# Patient Record
Sex: MALE | Employment: FULL TIME | URBAN - METROPOLITAN AREA
[De-identification: names, ages, dates, MRNs, and addresses within clinical notes are randomized per-mention and may not be internally consistent; named-entity substitution may affect disease eponyms.]

---

## 2024-05-31 ENCOUNTER — HOSPITAL ENCOUNTER (EMERGENCY)
Facility: HOSPITAL | Age: 39
Discharge: HOME | End: 2024-05-31
Payer: COMMERCIAL

## 2024-05-31 VITALS
WEIGHT: 162 LBS | DIASTOLIC BLOOD PRESSURE: 106 MMHG | SYSTOLIC BLOOD PRESSURE: 175 MMHG | BODY MASS INDEX: 26.99 KG/M2 | RESPIRATION RATE: 16 BRPM | HEART RATE: 76 BPM | HEIGHT: 65 IN | OXYGEN SATURATION: 99 % | TEMPERATURE: 98.5 F

## 2024-05-31 DIAGNOSIS — S05.02XA ABRASION OF LEFT CORNEA, INITIAL ENCOUNTER: Primary | ICD-10-CM

## 2024-05-31 PROCEDURE — 99283 EMERGENCY DEPT VISIT LOW MDM: CPT

## 2024-05-31 RX ORDER — CIPROFLOXACIN HYDROCHLORIDE 3 MG/ML
1 SOLUTION/ DROPS OPHTHALMIC
Qty: 5 ML | Refills: 0 | Status: SHIPPED | OUTPATIENT
Start: 2024-05-31 | End: 2024-06-10

## 2024-05-31 ASSESSMENT — COLUMBIA-SUICIDE SEVERITY RATING SCALE - C-SSRS
2. HAVE YOU ACTUALLY HAD ANY THOUGHTS OF KILLING YOURSELF?: NO
1. IN THE PAST MONTH, HAVE YOU WISHED YOU WERE DEAD OR WISHED YOU COULD GO TO SLEEP AND NOT WAKE UP?: NO
6. HAVE YOU EVER DONE ANYTHING, STARTED TO DO ANYTHING, OR PREPARED TO DO ANYTHING TO END YOUR LIFE?: NO

## 2024-05-31 ASSESSMENT — VISUAL ACUITY
OS: 20/30
OU: 20/15
OD: 20/25

## 2024-05-31 ASSESSMENT — LIFESTYLE VARIABLES
HAVE YOU EVER FELT YOU SHOULD CUT DOWN ON YOUR DRINKING: NO
TOTAL SCORE: 0
HAVE PEOPLE ANNOYED YOU BY CRITICIZING YOUR DRINKING: NO
EVER FELT BAD OR GUILTY ABOUT YOUR DRINKING: NO
EVER HAD A DRINK FIRST THING IN THE MORNING TO STEADY YOUR NERVES TO GET RID OF A HANGOVER: NO

## 2024-05-31 NOTE — Clinical Note
Rafael Arciniega was seen and treated in our emergency department on 5/31/2024.  He may return to work on 06/02/2024.       If you have any questions or concerns, please don't hesitate to call.      Umang Rebolledo PA-C

## 2024-06-01 NOTE — ED PROVIDER NOTES
EMERGENCY MEDICINE EVALUATION NOTE    History of Present Illness     Chief Complaint:   Chief Complaint   Patient presents with    Eye Pain     Left eye, was poked with finger in eye by 2 year old       HPI: Rafael Arciniega is a 38 y.o. male presents with a chief complaint of left eye pain.  Patient reports that he was poked in the left eye by his 2-year-old just prior to arrival.  He thinks he has a scratch on his eye as the child's fingernail scratched his eye.  He reports that he has no problems with his vision since then he just has a lot of pain.  Patient reports anytime he has his eye open the eye becomes more painful.  Patient admits to increased tearing.  Denies any significant past medical history denies any medication allergies.  Patient does not wear any corrective lenses or use any glasses.  Patient denies any medication allergies.    Previous History     Past Medical History:   Diagnosis Date    Unspecified fracture of lower end of left humerus, initial encounter for closed fracture     Elbow fracture, left     No past surgical history on file.     No family history on file.  No Known Allergies  Current Outpatient Medications   Medication Instructions    ciprofloxacin (Ciloxan) 0.3 % ophthalmic solution 1 drop, Left Eye, Every 2 hours       Physical Exam     Appearance: Alert, oriented , cooperative,  in no acute distress.      Skin: Intact,  dry skin, no lesions, rash, petechiae or purpura.      Eyes: PERRLA, EOMs intact,  Conjunctiva erythematous and slightly injected of left eye.  Corneal abrasion noted at the roughly 6 to 7 o'clock position in the left eye.  No Jvue sign.  Patient declined pressure testing.  Visual acuity documented by nursing staff.     ENT: Hearing grossly intact. Pharynx clear     Neck: Supple. Trachea at midline.      Pulmonary: Clear bilaterally. No rales, rhonchi or wheezing. No accessory muscle use or stridor.     Cardiac: Normal rate and rhythm without murmur     Abdomen:  "Soft, nontender, active bowel sounds.     Musculoskeletal: Full range of motion.      Neurological:Cranial nerves II through XII are grossly intact, normal sensation, no weakness, no focal findings identified.     Results   Labs Reviewed - No data to display  No orders to display         ED Course & Medical Decision Making   Medications - No data to display  Heart Rate:  [76]   Temperature:  [36.9 °C (98.5 °F)]   Respirations:  [16]   BP: (175)/(106)   Height:  [165.1 cm (5' 5\")]   Weight:  [73.5 kg (162 lb)]   Pulse Ox:  [99 %]    ED Course as of 05/31/24 2248   Fri May 31, 2024   2155 Patient's exam was consistent with corneal abrasion at approximately the 6 6 to 7 o'clock position on the left eye.  There is no Juve sign.  Patient was offered pressure testing which she declined.  Patient will have visual acuity performed.  Visual acuity was within normal limits patient be discharged home.  Patient be given Cipro eyedrops for home.  He was instructed to follow-up with ophthalmology with number provided.  Patient was encouraged return to the emergency department mainly with any worsening symptoms.  Patient also be given work note for the next day or 2.  Patient is in agreement this plan of care. [CJ]      ED Course User Index  [CJ] Umang Rebolledo PA-C         Diagnoses as of 05/31/24 2248   Abrasion of left cornea, initial encounter       Procedures   Procedures    Diagnosis     1. Abrasion of left cornea, initial encounter        Disposition   Discharged    ED Prescriptions       Medication Sig Dispense Start Date End Date Auth. Provider    ciprofloxacin (Ciloxan) 0.3 % ophthalmic solution Administer 1 drop into the left eye every 2 hours for 10 days. 5 mL 5/31/2024 6/10/2024 Umang Rebolledo PA-C            Disclaimer: This note was dictated by speech recognition. Minor errors in transcription may be present. Please call if questions.       Umang Rebolledo PA-C  05/31/24 2249    "

## 2025-02-22 ENCOUNTER — OFFICE VISIT (OUTPATIENT)
Dept: URGENT CARE | Age: 40
End: 2025-02-22
Payer: COMMERCIAL

## 2025-02-22 VITALS
RESPIRATION RATE: 16 BRPM | DIASTOLIC BLOOD PRESSURE: 99 MMHG | OXYGEN SATURATION: 99 % | TEMPERATURE: 98.5 F | BODY MASS INDEX: 28.32 KG/M2 | SYSTOLIC BLOOD PRESSURE: 147 MMHG | WEIGHT: 170 LBS | HEIGHT: 65 IN | HEART RATE: 79 BPM

## 2025-02-22 DIAGNOSIS — H10.89 OTHER CONJUNCTIVITIS OF LEFT EYE: Primary | ICD-10-CM

## 2025-02-22 RX ORDER — KETOROLAC TROMETHAMINE 5 MG/ML
1 SOLUTION OPHTHALMIC 4 TIMES DAILY
Qty: 3 ML | Refills: 0 | Status: SHIPPED | OUTPATIENT
Start: 2025-02-22 | End: 2025-02-26

## 2025-02-22 RX ORDER — TOBRAMYCIN 3 MG/ML
1 SOLUTION/ DROPS OPHTHALMIC EVERY 4 HOURS
Qty: 5 ML | Refills: 0 | Status: SHIPPED | OUTPATIENT
Start: 2025-02-22 | End: 2025-02-27

## 2025-02-22 ASSESSMENT — PATIENT HEALTH QUESTIONNAIRE - PHQ9
SUM OF ALL RESPONSES TO PHQ9 QUESTIONS 1 AND 2: 0
2. FEELING DOWN, DEPRESSED OR HOPELESS: NOT AT ALL
1. LITTLE INTEREST OR PLEASURE IN DOING THINGS: NOT AT ALL

## 2025-02-22 ASSESSMENT — ENCOUNTER SYMPTOMS
EYE PAIN: 0
EYE ITCHING: 0
DEPRESSION: 0
CONSTITUTIONAL NEGATIVE: 1
EYE REDNESS: 1
PHOTOPHOBIA: 0
LOSS OF SENSATION IN FEET: 0
EYE DISCHARGE: 1
OCCASIONAL FEELINGS OF UNSTEADINESS: 0

## 2025-02-22 NOTE — PROGRESS NOTES
"Subjective   Patient ID: Rafael Arciniega is a 39 y.o. male. They present today with a chief complaint of L eye irritation (C/O L eye irritation for the last four months. ).    History of Present Illness  Patient sustained an abrasion to his left cornea about 3 months ago when his son scratched him in the eye.  He was initially seen and treated in the emergency department with antibiotic eyedrops and tells me that his symptoms resolved.  He also says that he has had intermittent episodes of increased tearing and drainage from the eye since that initial incident.  For the last 4 or 5 days he has had drainage from the eye with crusting in the morning and tearing for the rest of the day.  Denies photophobia.  Denies new injury.  Denies vision changes.  He was treating his episodes with his antibiotic eyedrops but he lost the bottle.      History provided by:  Patient   used: No        Past Medical History  Allergies as of 02/22/2025    (No Known Allergies)       (Not in a hospital admission)       Past Medical History:   Diagnosis Date    Hypertension     Unspecified fracture of lower end of left humerus, initial encounter for closed fracture     Elbow fracture, left       History reviewed. No pertinent surgical history.     reports that he has never smoked. He has never used smokeless tobacco. He reports that he does not use drugs.    Review of Systems  Review of Systems   Constitutional: Negative.    Eyes:  Positive for discharge and redness. Negative for photophobia, pain, itching and visual disturbance.                                  Objective    Vitals:    02/22/25 1659   BP: (!) 147/99   BP Location: Left arm   Patient Position: Sitting   BP Cuff Size: Adult   Pulse: 79   Resp: 16   Temp: 36.9 °C (98.5 °F)   TempSrc: Oral   SpO2: 99%   Weight: 77.1 kg (170 lb)   Height: 1.651 m (5' 5\")     No LMP for male patient.    Physical Exam  Vitals and nursing note reviewed.   Constitutional:       " Appearance: Normal appearance.      Comments: Pleasant alert oriented well-nourished well-developed 2-year-old male in no acute distress.   HENT:      Nose: Nose normal.      Mouth/Throat:      Mouth: Mucous membranes are moist.   Eyes:      Extraocular Movements: Extraocular movements intact.      Pupils: Pupils are equal, round, and reactive to light.      Comments: Conjunctiva and sclera of the left eye is minimally injected.  There is no obvious foreign body.  Lids everted and again no foreign body.  No lid edema.  There is scanty crusty matter on the upper lashes otherwise no drainage or discharge.   Neurological:      Mental Status: He is alert.         Procedures    Point of Care Test & Imaging Results from this visit  No results found for this visit on 02/22/25.   No results found.    Diagnostic study results (if any) were reviewed by Samra Escalante PA-C.    Assessment/Plan   Allergies, medications, history, and pertinent labs/EKGs/Imaging reviewed by Samra Escalante PA-C.     Medical Decision Making  History and physical examination are consistent with a recurrent conjunctivitis.  Possibly he is using contaminated eyedrops from his initial injury 3 months ago.  Will prescribe a fresh bottle of Tobrex for him as well as some acular eyedrops for comfort.  Will make a referral to ophthalmology.  The importance of keeping the ophthalmology appointment was emphasized to the patient.  If he develops any new symptoms increased pain changes in vision severe swelling he is encouraged to go to the emergency department    Orders and Diagnoses  Diagnoses and all orders for this visit:  Other conjunctivitis of left eye  -     ketorolac (Acular) 0.5 % ophthalmic solution; Administer 1 drop into the left eye 4 times a day for 4 days.  -     tobramycin (Tobrex) 0.3 % ophthalmic solution; Administer 1 drop into the left eye every 4 hours for 5 days.      Medical Admin Record      Patient disposition:  Home    Electronically signed by Samra Escalante PA-C  5:32 PM